# Patient Record
Sex: MALE | Race: WHITE | Employment: OTHER | URBAN - METROPOLITAN AREA
[De-identification: names, ages, dates, MRNs, and addresses within clinical notes are randomized per-mention and may not be internally consistent; named-entity substitution may affect disease eponyms.]

---

## 2021-08-23 RX ORDER — DIPHENHYDRAMINE HYDROCHLORIDE 50 MG/ML
50 INJECTION, SOLUTION INTRAMUSCULAR; INTRAVENOUS
Status: DISCONTINUED | OUTPATIENT
Start: 2021-08-26 | End: 2021-08-28 | Stop reason: HOSPADM

## 2021-08-23 RX ORDER — ACETAMINOPHEN 325 MG/1
650 TABLET ORAL ONCE
Status: COMPLETED | OUTPATIENT
Start: 2021-08-26 | End: 2021-08-26

## 2021-08-23 RX ORDER — ACETAMINOPHEN 325 MG/1
650 TABLET ORAL
Status: ACTIVE | OUTPATIENT
Start: 2021-08-26 | End: 2021-08-26

## 2021-08-23 RX ORDER — DIPHENHYDRAMINE HYDROCHLORIDE 50 MG/ML
50 INJECTION, SOLUTION INTRAMUSCULAR; INTRAVENOUS ONCE
Status: COMPLETED | OUTPATIENT
Start: 2021-08-26 | End: 2021-08-26

## 2021-08-26 ENCOUNTER — HOSPITAL ENCOUNTER (OUTPATIENT)
Dept: INFUSION THERAPY | Age: 71
Discharge: HOME | End: 2021-08-26
Payer: MEDICARE

## 2021-08-26 VITALS
TEMPERATURE: 96.7 F | SYSTOLIC BLOOD PRESSURE: 127 MMHG | RESPIRATION RATE: 18 BRPM | OXYGEN SATURATION: 99 % | DIASTOLIC BLOOD PRESSURE: 68 MMHG | HEART RATE: 62 BPM

## 2021-08-26 PROCEDURE — 74011250636 HC RX REV CODE- 250/636: Performed by: INTERNAL MEDICINE

## 2021-08-26 PROCEDURE — 74011250637 HC RX REV CODE- 250/637: Performed by: INTERNAL MEDICINE

## 2021-08-26 PROCEDURE — 96417 CHEMO IV INFUS EACH ADDL SEQ: CPT

## 2021-08-26 PROCEDURE — 96413 CHEMO IV INFUSION 1 HR: CPT

## 2021-08-26 RX ADMIN — ACETAMINOPHEN 650 MG: 325 TABLET ORAL at 08:21

## 2021-08-26 RX ADMIN — DIPHENHYDRAMINE HYDROCHLORIDE 50 MG: 50 INJECTION INTRAMUSCULAR; INTRAVENOUS at 08:26

## 2021-08-26 RX ADMIN — METHYLPREDNISOLONE SODIUM SUCCINATE 125 MG: 125 INJECTION, POWDER, FOR SOLUTION INTRAMUSCULAR; INTRAVENOUS at 08:22

## 2021-08-26 RX ADMIN — SODIUM CHLORIDE 1000 MG: 9 INJECTION, SOLUTION INTRAVENOUS at 09:20

## 2021-08-26 NOTE — PROGRESS NOTES
[facility] Progress Note    Date: 2021    Name: Ezequiel Franz    MRN: [id number]         : 1950    Mr. Roddy Joshua Arrived ambulatory and in no distress for Ruxience  Regimen. Assessment was completed, no acute issues at this time, no new complaints voiced. Left forearm PIV #24 accessed without difficulty. Chemotherapy Flowsheet 2021   Drug / Regimen Ruxience   Pre Meds given   Notes Induction           Mr. Marrero Kasal vitals were reviewed.   Visit Vitals  /68   Pulse 62   Temp (!) 96.7 °F (35.9 °C)   Resp 18   SpO2 99%     Patient Vitals for the past 12 hrs:   Temp Pulse Resp BP SpO2   21 1344 (!) 96.7 °F (35.9 °C) 62 18 127/68    21 1250 (!) 96.7 °F (35.9 °C) 62 18 127/74    21 1220 97.6 °F (36.4 °C) 62 18 127/72    21 1150 (!) 96.7 °F (35.9 °C) (!) 56  131/75    21 1120 96.9 °F (36.1 °C) (!) 56 18 132/76    21 1020 (!) 96.3 °F (35.7 °C) (!) 54 16 123/77 99 %   21 0950 (!) 96.3 °F (35.7 °C) 60 16 130/81 97 %   21 0937    130/81    21 0755 (!) 96.3 °F (35.7 °C) (!) 57 16 122/75 97 %       Medications:  Medications Administered     acetaminophen (TYLENOL) tablet 650 mg     Admin Date  2021 Action  Given Dose  650 mg Route  Oral Administered By  Yessi Lockett RN          diphenhydrAMINE (BENADRYL) injection 50 mg     Admin Date  2021 Action  Given Dose  50 mg Route  IntraVENous Administered By  Yessi Lockett RN          methylPREDNISolone (PF) (Solu-MEDROL) injection 125 mg     Admin Date  2021 Action  Given Dose  125 mg Route  IntraVENous Administered By  Yessi Lockett RN          riTUXimab-pvvr (RUXIENCE) 1,000 mg in 0.9% sodium chloride 1,000 mL infusion     Admin Date  2021 Action  New Bag Dose  1,000 mg Rate   Route  IntraVENous Administered By  Yessi Lockett RN           Admin Date  2021 Action  Rate Change Dose   Rate  100 mL/hr Route  IntraVENous Administered By  Sam Arellano Mansi Osuna RN           Admin Date  08/26/2021 Action  Rate Change Dose   Rate  150 mL/hr Route  IntraVENous Administered By  Yessi Lockett RN           Admin Date  08/26/2021 Action  Rate Change Dose   Rate  200 mL/hr Route  IntraVENous Administered By  Yessi Lockett RN           Admin Date  08/26/2021 Action  Rate Change Dose   Rate  250 mL/hr Route  IntraVENous Administered By  Heidy Unger RN           Admin Date  08/26/2021 Action  Rate Change Dose   Rate  300 mL/hr Route  IntraVENous Administered By  Yessi Lockett RN           Admin Date  08/26/2021 Action  Rate Change Dose   Rate  350 mL/hr Route  IntraVENous Administered By  Yessi Lockett RN           Admin Date  08/26/2021 Action  Rate Change Dose   Rate  400 mL/hr Route  IntraVENous Administered By  Yessi Lockett RN              Discussed discharge along with  signs and symptoms of adverse reaction to treatment. Mr. Roddy Joshua tolerated treatment well and was discharged from [place] [address] in stable condition. Port de-accessed, flushed & heparinized per protocol. He is to return on September 9 at 0730 for his next appointment.     Chay Jones RN  August 26, 2021

## 2021-09-01 RX ORDER — DIPHENHYDRAMINE HYDROCHLORIDE 50 MG/ML
50 INJECTION, SOLUTION INTRAMUSCULAR; INTRAVENOUS
Status: DISCONTINUED | OUTPATIENT
Start: 2021-09-09 | End: 2021-09-11 | Stop reason: HOSPADM

## 2021-09-01 RX ORDER — ACETAMINOPHEN 325 MG/1
650 TABLET ORAL
Status: ACTIVE | OUTPATIENT
Start: 2021-09-09 | End: 2021-09-09

## 2021-09-01 RX ORDER — DIPHENHYDRAMINE HYDROCHLORIDE 50 MG/ML
50 INJECTION, SOLUTION INTRAMUSCULAR; INTRAVENOUS ONCE
Status: COMPLETED | OUTPATIENT
Start: 2021-09-09 | End: 2021-09-09

## 2021-09-01 RX ORDER — ACETAMINOPHEN 325 MG/1
650 TABLET ORAL ONCE
Status: COMPLETED | OUTPATIENT
Start: 2021-09-09 | End: 2021-09-09

## 2021-09-09 ENCOUNTER — HOSPITAL ENCOUNTER (OUTPATIENT)
Dept: INFUSION THERAPY | Age: 71
Discharge: HOME | End: 2021-09-09
Payer: MEDICARE

## 2021-09-09 VITALS
HEIGHT: 76 IN | BODY MASS INDEX: 25.56 KG/M2 | RESPIRATION RATE: 18 BRPM | SYSTOLIC BLOOD PRESSURE: 125 MMHG | DIASTOLIC BLOOD PRESSURE: 74 MMHG | HEART RATE: 62 BPM | WEIGHT: 209.9 LBS | TEMPERATURE: 96.3 F

## 2021-09-09 PROCEDURE — 74011250637 HC RX REV CODE- 250/637: Performed by: INTERNAL MEDICINE

## 2021-09-09 PROCEDURE — 96374 THER/PROPH/DIAG INJ IV PUSH: CPT

## 2021-09-09 PROCEDURE — 74011000258 HC RX REV CODE- 258: Performed by: INTERNAL MEDICINE

## 2021-09-09 PROCEDURE — 96413 CHEMO IV INFUSION 1 HR: CPT

## 2021-09-09 PROCEDURE — 96417 CHEMO IV INFUS EACH ADDL SEQ: CPT

## 2021-09-09 PROCEDURE — 74011250636 HC RX REV CODE- 250/636: Performed by: INTERNAL MEDICINE

## 2021-09-09 RX ORDER — ROSUVASTATIN CALCIUM 20 MG/1
20 TABLET, COATED ORAL
COMMUNITY

## 2021-09-09 RX ORDER — BUMETANIDE 1 MG/1
1 TABLET ORAL DAILY
COMMUNITY

## 2021-09-09 RX ADMIN — METHYLPREDNISOLONE SODIUM SUCCINATE 125 MG: 125 INJECTION, POWDER, FOR SOLUTION INTRAMUSCULAR; INTRAVENOUS at 07:59

## 2021-09-09 RX ADMIN — DIPHENHYDRAMINE HYDROCHLORIDE 50 MG: 50 INJECTION INTRAMUSCULAR; INTRAVENOUS at 07:54

## 2021-09-09 RX ADMIN — ACETAMINOPHEN 650 MG: 325 TABLET ORAL at 07:49

## 2021-09-09 RX ADMIN — SODIUM CHLORIDE 1000 MG: 900 INJECTION, SOLUTION INTRAVENOUS at 08:46

## 2021-09-09 NOTE — PROGRESS NOTES
[facility] Short Visit Progress Note     Patient admitted to [facility] for [address] [place] [address] ambulatory in stable condition. Assessment completed. No new concerns voiced. Covid Screening      1. Do you have any symptoms of COVID-19? SOB, coughing, fever, or generally not feeling well ? NO  2. Have you been exposed to COVID-19 recently? NO  3. Have you had any recent contact with family/friend that has a pending COVID test? NO    Vital Signs:  Visit Vitals  /68   Pulse 61   Temp 97.6 °F (36.4 °C)   Resp 18   Ht 6' 4\" (1.93 m)   Wt 95.2 kg (209 lb 14.4 oz)   BMI 25.55 kg/m²     Patient Vitals for the past 12 hrs:   Temp Pulse Resp BP   09/09/21 1213 (!) 96.3 °F (35.7 °C) 62 18 125/74   09/09/21 0945 (!) 96.3 °F (35.7 °C) (!) 52 18 120/74   09/09/21 0915 97.5 °F (36.4 °C) (!) 58 18 113/71   09/09/21 0732 97.6 °F (36.4 °C) 61 18 127/68         Left wrist PIV #24 with positive blood return.      Lab Results:  None        Medications:    Medications Administered     acetaminophen (TYLENOL) tablet 650 mg     Admin Date  09/09/2021 Action  Given Dose  650 mg Route  Oral Administered By  Belkys Edwards RN          diphenhydrAMINE (BENADRYL) injection 50 mg     Admin Date  09/09/2021 Action  Given Dose  50 mg Route  IntraVENous Administered By  Belkys Edwards RN          methylPREDNISolone (PF) (Solu-MEDROL) injection 125 mg     Admin Date  09/09/2021 Action  Given Dose  125 mg Route  IntraVENous Administered By  Belkys Edwards RN          riTUXimab-pvvr (RUXIENCE) 1,000 mg in 0.9% sodium chloride 250 mL infusion     Admin Date  09/09/2021 Action  New Bag Dose  1,000 mg Rate  25 mL/hr Route  IntraVENous Administered By  Belkys Edwards RN           Admin Date  09/09/2021 Action  Rate Change Dose   Rate  50 mL/hr Route  IntraVENous Administered By  Belkys Edwards RN           Admin Date  09/09/2021 Action  Rate Change Dose   Rate  75 mL/hr Route  IntraVENous Administered By  Elsa Long CARLOS A CLAY           Admin Date  09/09/2021 Action  Rate Change Dose   Rate  100 mL/hr Route  IntraVENous Administered By  Giovana Cardona, CARLOS A               Patient tolerated treatment well. Patient discharged from [place] [address] ambulatory in no distress. Patient aware of next appointment.     Future Appointments   Date Time Provider Ness Batista   2/28/2022  7:30 AM SS INF4 CH2 >7H [facility]   3/14/2022  7:30 AM SS INF4 CH2 >7H [facility] [address] [place] [address]

## 2022-02-21 RX ORDER — ACETAMINOPHEN 325 MG/1
650 TABLET ORAL
Status: ACTIVE | OUTPATIENT
Start: 2022-02-28 | End: 2022-02-28

## 2022-02-21 RX ORDER — DIPHENHYDRAMINE HYDROCHLORIDE 50 MG/ML
50 INJECTION, SOLUTION INTRAMUSCULAR; INTRAVENOUS ONCE
Status: ACTIVE | OUTPATIENT
Start: 2022-02-28 | End: 2022-02-28

## 2022-02-21 RX ORDER — ACETAMINOPHEN 325 MG/1
650 TABLET ORAL ONCE
Status: ACTIVE | OUTPATIENT
Start: 2022-02-28 | End: 2022-02-28

## 2022-02-21 RX ORDER — DIPHENHYDRAMINE HYDROCHLORIDE 50 MG/ML
50 INJECTION, SOLUTION INTRAMUSCULAR; INTRAVENOUS
Status: DISCONTINUED | OUTPATIENT
Start: 2022-02-28 | End: 2022-03-31 | Stop reason: HOSPADM

## 2022-02-28 ENCOUNTER — HOSPITAL ENCOUNTER (OUTPATIENT)
Dept: INFUSION THERAPY | Age: 72
Discharge: HOME | End: 2022-02-28

## 2022-03-14 ENCOUNTER — APPOINTMENT (OUTPATIENT)
Dept: INFUSION THERAPY | Age: 72
End: 2022-03-14

## 2022-09-01 ENCOUNTER — APPOINTMENT (OUTPATIENT)
Dept: INFUSION THERAPY | Age: 72
End: 2022-09-01